# Patient Record
Sex: MALE | Race: BLACK OR AFRICAN AMERICAN | NOT HISPANIC OR LATINO | URBAN - NONMETROPOLITAN AREA
[De-identification: names, ages, dates, MRNs, and addresses within clinical notes are randomized per-mention and may not be internally consistent; named-entity substitution may affect disease eponyms.]

---

## 2019-03-06 NOTE — PROCEDURE NOTE: CLINICAL
PROCEDURE NOTE: Chalazion Injection Right Lower Lid. Diagnosis: Chalazion. Prior to treatment, the risks/benefits/alternatives were discussed. The patient wished to proceed with procedure. Site of Injection: *. Kenalog *units injected. Patient tolerated procedure well. There were no complications. Post procedure instructions given. Lilia Lara

## 2021-01-25 ENCOUNTER — IMPORTED ENCOUNTER (OUTPATIENT)
Dept: URBAN - NONMETROPOLITAN AREA CLINIC 1 | Facility: CLINIC | Age: 51
End: 2021-01-25

## 2021-01-25 PROBLEM — H52.221: Noted: 2021-01-25

## 2021-01-25 PROBLEM — H52.03: Noted: 2021-01-25

## 2021-01-25 PROBLEM — H52.4: Noted: 2021-01-25

## 2021-01-25 PROCEDURE — 92015 DETERMINE REFRACTIVE STATE: CPT

## 2021-01-25 PROCEDURE — 92014 COMPRE OPH EXAM EST PT 1/>: CPT

## 2021-01-25 NOTE — PATIENT DISCUSSION
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

## 2022-04-09 ASSESSMENT — KERATOMETRY
OD_K1POWER_DIOPTERS: 41.25
OS_AXISANGLE_DEGREES: 163
OD_AXISANGLE_DEGREES: 091
OS_K2POWER_DIOPTERS: 41.50
OD_K2POWER_DIOPTERS: 41.50
OS_K1POWER_DIOPTERS: 40.75

## 2022-04-09 ASSESSMENT — TONOMETRY
OS_IOP_MMHG: 12
OD_IOP_MMHG: 12

## 2022-04-09 ASSESSMENT — VISUAL ACUITY
OD_CC: 20/20
OU_CC: 20/20
OU_SC: 20/30 (BLURRY)
OS_CC: 20/20